# Patient Record
Sex: FEMALE | Race: BLACK OR AFRICAN AMERICAN | Employment: UNEMPLOYED | URBAN - METROPOLITAN AREA
[De-identification: names, ages, dates, MRNs, and addresses within clinical notes are randomized per-mention and may not be internally consistent; named-entity substitution may affect disease eponyms.]

---

## 2017-04-25 ENCOUNTER — OFFICE VISIT (OUTPATIENT)
Dept: INTERNAL MEDICINE CLINIC | Age: 57
End: 2017-04-25

## 2017-04-25 VITALS
WEIGHT: 256 LBS | HEIGHT: 66 IN | TEMPERATURE: 98.5 F | RESPIRATION RATE: 18 BRPM | HEART RATE: 83 BPM | SYSTOLIC BLOOD PRESSURE: 134 MMHG | BODY MASS INDEX: 41.14 KG/M2 | OXYGEN SATURATION: 96 % | DIASTOLIC BLOOD PRESSURE: 88 MMHG

## 2017-04-25 DIAGNOSIS — Z11.59 NEED FOR HEPATITIS C SCREENING TEST: ICD-10-CM

## 2017-04-25 DIAGNOSIS — E78.5 DYSLIPIDEMIA: Chronic | ICD-10-CM

## 2017-04-25 DIAGNOSIS — R05.9 COUGH: ICD-10-CM

## 2017-04-25 DIAGNOSIS — M25.562 PAIN IN BOTH KNEES, UNSPECIFIED CHRONICITY: ICD-10-CM

## 2017-04-25 DIAGNOSIS — Z76.0 MEDICATION REFILL: ICD-10-CM

## 2017-04-25 DIAGNOSIS — M25.561 PAIN IN BOTH KNEES, UNSPECIFIED CHRONICITY: ICD-10-CM

## 2017-04-25 DIAGNOSIS — J06.9 UPPER RESPIRATORY TRACT INFECTION, UNSPECIFIED TYPE: ICD-10-CM

## 2017-04-25 DIAGNOSIS — I10 ESSENTIAL HYPERTENSION: Primary | Chronic | ICD-10-CM

## 2017-04-25 DIAGNOSIS — M25.50 ARTHRALGIA, UNSPECIFIED JOINT: ICD-10-CM

## 2017-04-25 RX ORDER — POTASSIUM CHLORIDE 20 MEQ/1
20 TABLET, EXTENDED RELEASE ORAL DAILY
Qty: 30 TAB | Refills: 5 | Status: SHIPPED | OUTPATIENT
Start: 2017-04-25 | End: 2018-06-05 | Stop reason: SDUPTHER

## 2017-04-25 RX ORDER — METHYLPREDNISOLONE 4 MG/1
TABLET ORAL
Qty: 1 DOSE PACK | Refills: 0 | Status: SHIPPED | OUTPATIENT
Start: 2017-04-25 | End: 2018-09-17

## 2017-04-25 RX ORDER — MELOXICAM 15 MG/1
15 TABLET ORAL DAILY
Qty: 30 TAB | Refills: 5 | Status: SHIPPED | OUTPATIENT
Start: 2017-04-25 | End: 2018-06-05 | Stop reason: SDUPTHER

## 2017-04-25 RX ORDER — CEFPROZIL 500 MG/1
500 TABLET, FILM COATED ORAL 2 TIMES DAILY
Qty: 20 TAB | Refills: 0 | Status: SHIPPED | OUTPATIENT
Start: 2017-04-25 | End: 2018-09-17

## 2017-04-25 RX ORDER — AMITRIPTYLINE HYDROCHLORIDE 25 MG/1
25 TABLET, FILM COATED ORAL
Qty: 30 TAB | Refills: 5 | Status: SHIPPED | OUTPATIENT
Start: 2017-04-25 | End: 2018-06-05 | Stop reason: SDUPTHER

## 2017-04-25 RX ORDER — FUROSEMIDE 40 MG/1
40 TABLET ORAL DAILY
Qty: 30 TAB | Refills: 5 | Status: SHIPPED | OUTPATIENT
Start: 2017-04-25 | End: 2018-06-05 | Stop reason: SDUPTHER

## 2017-04-25 RX ORDER — ATORVASTATIN CALCIUM 10 MG/1
10 TABLET, FILM COATED ORAL DAILY
Qty: 30 TAB | Refills: 5 | Status: CANCELLED | OUTPATIENT
Start: 2017-04-25

## 2017-04-25 RX ORDER — ALBUTEROL SULFATE 90 UG/1
2 AEROSOL, METERED RESPIRATORY (INHALATION)
Qty: 1 INHALER | Refills: 5 | Status: SHIPPED | OUTPATIENT
Start: 2017-04-25 | End: 2018-06-05 | Stop reason: SDUPTHER
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
## 2017-04-27 LAB
ALBUMIN SERPL-MCNC: 3.8 G/DL (ref 3.5–5.5)
ALBUMIN/GLOB SERPL: 1.2 {RATIO} (ref 1.2–2.2)
ALDOLASE SERPL-CCNC: 5.6 U/L (ref 3.3–10.3)
ALP SERPL-CCNC: 98 IU/L (ref 39–117)
ALT SERPL-CCNC: 8 IU/L (ref 0–32)
ANA SER QL: NEGATIVE
AST SERPL-CCNC: 12 IU/L (ref 0–40)
BILIRUB SERPL-MCNC: <0.2 MG/DL (ref 0–1.2)
BUN SERPL-MCNC: 18 MG/DL (ref 6–24)
BUN/CREAT SERPL: 37 (ref 9–23)
CALCIUM SERPL-MCNC: 9.6 MG/DL (ref 8.7–10.2)
CCP IGA+IGG SERPL IA-ACNC: 10 UNITS (ref 0–19)
CHLORIDE SERPL-SCNC: 102 MMOL/L (ref 96–106)
CHOLEST SERPL-MCNC: 215 MG/DL (ref 100–199)
CK SERPL-CCNC: 50 U/L (ref 24–173)
CO2 SERPL-SCNC: 22 MMOL/L (ref 18–29)
CREAT SERPL-MCNC: 0.49 MG/DL (ref 0.57–1)
GLOBULIN SER CALC-MCNC: 3.3 G/DL (ref 1.5–4.5)
GLUCOSE SERPL-MCNC: 90 MG/DL (ref 65–99)
HCV AB S/CO SERPL IA: <0.1 S/CO RATIO (ref 0–0.9)
HCV AB SERPL QL IA: NORMAL
HDLC SERPL-MCNC: 42 MG/DL
INTERPRETATION, 910389: NORMAL
LDLC SERPL CALC-MCNC: 142 MG/DL (ref 0–99)
POTASSIUM SERPL-SCNC: 3.9 MMOL/L (ref 3.5–5.2)
PROT SERPL-MCNC: 7.1 G/DL (ref 6–8.5)
RHEUMATOID FACT SERPL-ACNC: <10 IU/ML (ref 0–13.9)
SODIUM SERPL-SCNC: 141 MMOL/L (ref 134–144)
TRIGL SERPL-MCNC: 155 MG/DL (ref 0–149)
URATE SERPL-MCNC: 5.4 MG/DL (ref 2.5–7.1)
VLDLC SERPL CALC-MCNC: 31 MG/DL (ref 5–40)

## 2017-04-28 RX ORDER — ATORVASTATIN CALCIUM 20 MG/1
20 TABLET, FILM COATED ORAL DAILY
Qty: 30 TAB | Refills: 5 | Status: SHIPPED | OUTPATIENT
Start: 2017-04-28 | End: 2018-06-05 | Stop reason: SDUPTHER

## 2018-06-05 ENCOUNTER — TELEPHONE (OUTPATIENT)
Dept: INTERNAL MEDICINE CLINIC | Age: 58
End: 2018-06-05

## 2018-06-05 ENCOUNTER — OFFICE VISIT (OUTPATIENT)
Dept: INTERNAL MEDICINE CLINIC | Age: 58
End: 2018-06-05

## 2018-06-05 VITALS
HEIGHT: 66 IN | HEART RATE: 84 BPM | SYSTOLIC BLOOD PRESSURE: 147 MMHG | WEIGHT: 241.8 LBS | DIASTOLIC BLOOD PRESSURE: 83 MMHG | BODY MASS INDEX: 38.86 KG/M2 | OXYGEN SATURATION: 100 % | TEMPERATURE: 96.7 F | RESPIRATION RATE: 16 BRPM

## 2018-06-05 DIAGNOSIS — E78.5 DYSLIPIDEMIA: Chronic | ICD-10-CM

## 2018-06-05 DIAGNOSIS — I10 ESSENTIAL HYPERTENSION: Chronic | ICD-10-CM

## 2018-06-05 DIAGNOSIS — R51.9 ACUTE NONINTRACTABLE HEADACHE, UNSPECIFIED HEADACHE TYPE: Primary | ICD-10-CM

## 2018-06-05 DIAGNOSIS — Z76.0 MEDICATION REFILL: ICD-10-CM

## 2018-06-05 PROBLEM — E66.01 SEVERE OBESITY (BMI 35.0-39.9): Status: ACTIVE | Noted: 2018-06-05

## 2018-06-05 RX ORDER — BUTALBITAL, ACETAMINOPHEN AND CAFFEINE 50; 325; 40 MG/1; MG/1; MG/1
1 TABLET ORAL
Qty: 30 TAB | Refills: 1 | Status: SHIPPED | OUTPATIENT
Start: 2018-06-05

## 2018-06-05 RX ORDER — LORATADINE 10 MG/1
10 TABLET ORAL DAILY
Qty: 30 TAB | Refills: 5 | Status: SHIPPED | OUTPATIENT
Start: 2018-06-05 | End: 2018-09-17

## 2018-06-05 RX ORDER — FUROSEMIDE 40 MG/1
40 TABLET ORAL DAILY
Qty: 30 TAB | Refills: 5 | Status: SHIPPED | OUTPATIENT
Start: 2018-06-05 | End: 2018-08-30 | Stop reason: SDUPTHER

## 2018-06-05 RX ORDER — ATORVASTATIN CALCIUM 20 MG/1
20 TABLET, FILM COATED ORAL DAILY
Qty: 30 TAB | Refills: 5 | Status: SHIPPED | OUTPATIENT
Start: 2018-06-05

## 2018-06-05 RX ORDER — GUAIFENESIN 100 MG/5ML
81 LIQUID (ML) ORAL DAILY
COMMUNITY

## 2018-06-05 RX ORDER — KETOROLAC TROMETHAMINE 30 MG/ML
30 INJECTION, SOLUTION INTRAMUSCULAR; INTRAVENOUS ONCE
Qty: 1 VIAL | Refills: 0
Start: 2018-06-05 | End: 2018-06-05

## 2018-06-05 RX ORDER — ALBUTEROL SULFATE 90 UG/1
2 AEROSOL, METERED RESPIRATORY (INHALATION)
Qty: 1 INHALER | Refills: 5 | Status: SHIPPED | OUTPATIENT
Start: 2018-06-05 | End: 2018-08-30 | Stop reason: SDUPTHER

## 2018-06-05 RX ORDER — MELOXICAM 15 MG/1
15 TABLET ORAL DAILY
Qty: 30 TAB | Refills: 5 | Status: SHIPPED | OUTPATIENT
Start: 2018-06-05

## 2018-06-05 RX ORDER — AMITRIPTYLINE HYDROCHLORIDE 25 MG/1
25 TABLET, FILM COATED ORAL
Qty: 30 TAB | Refills: 5 | Status: SHIPPED | OUTPATIENT
Start: 2018-06-05

## 2018-06-05 RX ORDER — POTASSIUM CHLORIDE 20 MEQ/1
20 TABLET, EXTENDED RELEASE ORAL DAILY
Qty: 30 TAB | Refills: 5 | Status: SHIPPED | OUTPATIENT
Start: 2018-06-05

## 2018-06-05 NOTE — MR AVS SNAPSHOT
[facility] 
 
 
 [address] [facility] [address] [id number] 
[phone] Patient: Jordi Hilton 
MRN: [id number]:[id number] Visit Information Date & Time Provider Department Dept. Phone Encounter #  
 6/5/2018  8:45 AM Fela Galan MD [address] [phone] [id number] Follow-up Instructions Return in about 5 weeks (around 7/10/2018) for 30 minutes, multiple issues. Upcoming Health Maintenance Date Due COLONOSCOPY 6/25/1976 Pneumococcal 19-64 Medium Risk (1 of 1 - PPSV23) 6/25/1977 DTaP/Tdap/Td series (1 - Tdap) 6/25/1979 BREAST CANCER SCRN MAMMOGRAM 6/25/2008 ZOSTER VACCINE AGE 60> 4/25/2018 Influenza Age 5 to Adult 8/1/2018 PAP AKA CERVICAL CYTOLOGY 10/23/2018 Allergies as of 6/5/2018  Review Complete On: 6/5/2018 By: Andreas Smith LPN No Known Allergies Current Immunizations  Reviewed on 3/6/2018 Name Date Influenza Vaccine 10/19/2010 Not reviewed this visit You Were Diagnosed With   
  
 Codes Comments Acute nonintractable headache, unspecified headache type    -  Primary ICD-10-CM: R51 ICD-9-CM: 784.0 Essential hypertension     ICD-10-CM: I10 
ICD-9-CM: 401.9 Dyslipidemia     ICD-10-CM: E78.5 ICD-9-CM: 272.4 Medication refill     ICD-10-CM: Z76.0 ICD-9-CM: V68.1 Vitals BP Pulse Temp Resp Height(growth percentile) Weight(growth percentile) 147/83 (BP 1 Location: Right arm, BP Patient Position: Sitting) 84 96.7 °F (35.9 °C) 16 5' 6\" (1.676 m) 241 lb 12.8 oz (109.7 kg) SpO2 BMI OB Status Smoking Status 100% 39.03 kg/m2 Postmenopausal Current Every Day Smoker Vitals History BMI and BSA Data Body Mass Index Body Surface Area 39.03 kg/m 2 2.26 m 2 Preferred Pharmacy Pharmacy Name Phone [facility] [address]  [facility] [phone] Your Updated Medication List  
  
   
This list is accurate as of 6/5/18  9:27 AM.  Always use your most recent med list.  
  
  
  
  
 albuterol 90 mcg/actuation inhaler Commonly known as:  VENTOLIN HFA Take 2 Puffs by inhalation every six (6) hours as needed for Wheezing. amitriptyline 25 mg tablet Commonly known as:  ELAVIL Take 1 Tab by mouth nightly. aspirin 81 mg chewable tablet Take 81 mg by mouth daily. atorvastatin 20 mg tablet Commonly known as:  LIPITOR Take 1 Tab by mouth daily. beclomethasone 80 mcg/actuation [employer] Commonly known as:  QVAR Take 1 Puff by inhalation two (2) times a day. butalbital-acetaminophen-caffeine -40 mg per tablet Commonly known as:  Ari Lang Take 1 Tab by mouth every six (6) hours as needed for Pain. cefPROZIL 500 mg tablet Commonly known as:  CEFZIL Take 1 Tab by mouth two (2) times a day. furosemide 40 mg tablet Commonly known as:  LASIX Take 1 Tab by mouth daily. HYDROcodone-acetaminophen 5-325 mg per tablet Commonly known as:  Billye Dayton Take 1 Tab by mouth every four (4) hours as needed for Pain. Max Daily Amount: 6 Tabs.  
  
 ketorolac 30 mg/mL (1 mL) injection Commonly known as:  TORADOL  
1 mL by IntraVENous route once for 1 dose. loratadine 10 mg tablet Commonly known as:  Phyliss Fly Take 1 Tab by mouth daily. meloxicam 15 mg tablet Commonly known as:  MOBIC Take 1 Tab by mouth daily. methylPREDNISolone 4 mg tablet Commonly known as:  [place] Sample Take as directed on the package. potassium chloride 20 mEq tablet Commonly known as:  K-DUR, KLOR-CON Take 1 Tab by mouth daily. sulfacetamide 10 % ophthalmic solution Commonly known as:  BLEPH-10 Administer 1 Drop to both eyes every four (4) hours. Prescriptions Printed  Refills  
 butalbital-acetaminophen-caffeine (FIORICET, ESGIC) -40 mg per tablet 1  
 Sig: Take 1 Tab by mouth every six (6) hours as needed for Pain. Class: Print Route: Oral  
  
Prescriptions Sent to Pharmacy Refills  
 albuterol (VENTOLIN HFA) 90 mcg/actuation inhaler 5 Sig: Take 2 Puffs by inhalation every six (6) hours as needed for Wheezing. Class: Normal  
 Pharmacy: [address] Ph #: [phone] Route: Inhalation  
 atorvastatin (LIPITOR) 20 mg tablet 5 Sig: Take 1 Tab by mouth daily. Class: Normal  
 Pharmacy: [address] Ph #: [phone] Route: Oral  
 beclomethasone (QVAR) 80 mcg/actuation aero 5 Sig: Take 1 Puff by inhalation two (2) times a day. Class: Normal  
 Pharmacy: [address] Ph #: [phone] Route: Inhalation  
 furosemide (LASIX) 40 mg tablet 5 Sig: Take 1 Tab by mouth daily. Class: Normal  
 Pharmacy: [address] Ph #: [phone] Route: Oral  
 potassium chloride (K-DUR, KLOR-CON) 20 mEq tablet 5 Sig: Take 1 Tab by mouth daily. Class: Normal  
 Pharmacy: [address] Ph #: [phone] Route: Oral  
 meloxicam (MOBIC) 15 mg tablet 5 Sig: Take 1 Tab by mouth daily. Class: Normal  
 Pharmacy: [address] Ph #: [phone] Route: Oral  
 amitriptyline (ELAVIL) 25 mg tablet 5 Sig: Take 1 Tab by mouth nightly. Class: Normal  
 Pharmacy: [address] Ph #: [phone] Route: Oral  
 loratadine (CLARITIN) 10 mg tablet 5 Sig: Take 1 Tab by mouth daily. Class: Normal  
 Pharmacy: [address] Ph #: [phone] Route: Oral  
  
We Performed the Following KETOROLAC TROMETHAMINE INJ [ [facility]] AK THER/PROPH/DIAG INJECTION, SUBCUT/IM [id number] CPT(R)] Follow-up Instructions Return in about 5 weeks (around 7/10/2018) for 30 minutes, multiple issues. Introducing [facility]! [employer] introduces Mov[employer] patient portal. Now you can access parts of your medical record, email your doctor's office, and request medication refills online. 1. In your internet browser, go to [email or web address] 2. Click on the First Time User? Click Here link in the Sign In box. You will see the New Member Sign Up page. 3. Enter your Movable Access Code exactly as it appears below. You will not need to use this code after youve completed the sign-up process. If you do not sign up before the expiration date, you must request a new code. · Movable Access Code: [email or web address] Expires: 9/3/2018  9:27 AM 
 
4. Enter the last four digits of your Social Security Number (xxxx) and Date of Birth (mm/dd/yyyy) as indicated and click Submit. You will be taken to the next sign-up page. 5. Create a Movable ID. This will be your Movable login ID and cannot be changed, so think of one that is secure and easy to remember. 6. Create a Movable password. You can change your password at any time. 7. Enter your Password Reset Question and Answer. This can be used at a later time if you forget your password. 8. Enter your e-mail address. You will receive e-mail notification when new information is available in [address] 9. Click Sign Up. You can now view and download portions of your medical record. 10. Click the Download Summary menu link to download a portable copy of your medical information. If you have questions, please visit the Frequently Asked Questions section of the Movable website. Remember, Movable is NOT to be used for urgent needs. For medical emergencies, dial 911. Now available from your iPhone and Android! Please provide this summary of care documentation to your next provider. Your primary care clinician is listed as [facility]. If you have any questions after today's visit, please call [phone].
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
## 2018-06-05 NOTE — TELEPHONE ENCOUNTER
PA request for : Qvar [employer]/ACT IN [place]    PA submitted through [facility] PA Form. Will await response via fax.

## 2018-06-05 NOTE — PROGRESS NOTES
HISTORY OF PRESENT ILLNESS  Shanel Collazo is a 59 y.o. female. Visit Vitals    /83 (BP 1 Location: Right arm, BP Patient Position: Sitting)    Pulse 84    Temp 96.7 °F (35.9 °C)    Resp 16    Ht 5' 6\" (1.676 m)    Wt 241 lb 12.8 oz (109.7 kg)    SpO2 100%    BMI 39.03 kg/m2       Headache   The history is provided by the patient. This is a chronic problem. The current episode started more than 1 week ago. The problem occurs daily. The problem has not changed since onset. Associated symptoms include headaches. Pertinent negatives include no chest pain and no shortness of breath. Exacerbated by: nothing. Relieved by: nothing. Treatments tried: aspitine q 6 h. The treatment provided mild relief. Review of Systems   Constitutional: Negative. HENT: Positive for congestion. Negative for ear pain and sore throat. Eyes: Negative for blurred vision and double vision. Respiratory: Negative for shortness of breath. Cardiovascular: Negative for chest pain and palpitations. Neurological: Positive for headaches. Physical Exam   Constitutional: She is oriented to person, place, and time. She appears well-developed and well-nourished. No distress. HENT:   Right Ear: External ear and ear canal normal. Tympanic membrane is bulging. Left Ear: External ear and ear canal normal. Tympanic membrane is bulging. Nose: Right sinus exhibits maxillary sinus tenderness. Right sinus exhibits no frontal sinus tenderness. Left sinus exhibits maxillary sinus tenderness. Mouth/Throat: Uvula is midline, oropharynx is clear and moist and mucous membranes are normal.   TMJs negative   Eyes: Conjunctivae and EOM are normal. Pupils are equal, round, and reactive to light. No photophobia. Mild pain palpating both temples   Neck: Neck supple. Cardiovascular: Normal rate. Pulmonary/Chest: Effort normal.   Lymphadenopathy:     She has no cervical adenopathy.    Neurological: She is alert and oriented to person, place, and time. Skin: Skin is warm and dry. She is not diaphoretic. Nursing note and vitals reviewed. ASSESSMENT and PLAN    ICD-10-CM ICD-9-CM    1. Acute nonintractable headache, unspecified headache type R51 784.0 ketorolac (TORADOL) 30 mg/mL (1 mL) injection      KETOROLAC TROMETHAMINE INJ      IN THER/PROPH/DIAG INJECTION, SUBCUT/IM      butalbital-acetaminophen-caffeine (FIORICET, ESGIC) -40 mg per tablet      loratadine (CLARITIN) 10 mg tablet   2. Essential hypertension I10 401.9    3. Dyslipidemia E78.5 272.4    4. Medication refill Z76.0 V68.1 albuterol (VENTOLIN HFA) 90 mcg/actuation inhaler      atorvastatin (LIPITOR) 20 mg tablet      beclomethasone (QVAR) 80 mcg/actuation aero      furosemide (LASIX) 40 mg tablet      potassium chloride (K-DUR, KLOR-CON) 20 mEq tablet      meloxicam (MOBIC) 15 mg tablet      amitriptyline (ELAVIL) 25 mg tablet       Headache without worrisome features. May be sinus, may be migraine. May be BP related    She has been out of her meds due to insurance issues--refill meds today  She has not been here in a while due to insurance issues    F/u 4-5 weeks for 30 minutes to  the pieces.

## 2018-07-09 ENCOUNTER — OFFICE VISIT (OUTPATIENT)
Dept: INTERNAL MEDICINE CLINIC | Age: 58
End: 2018-07-09

## 2018-07-09 VITALS
RESPIRATION RATE: 18 BRPM | HEIGHT: 66 IN | WEIGHT: 247.4 LBS | OXYGEN SATURATION: 100 % | SYSTOLIC BLOOD PRESSURE: 137 MMHG | HEART RATE: 88 BPM | TEMPERATURE: 98.1 F | DIASTOLIC BLOOD PRESSURE: 88 MMHG | BODY MASS INDEX: 39.76 KG/M2

## 2018-07-09 DIAGNOSIS — Z12.11 SCREEN FOR COLON CANCER: ICD-10-CM

## 2018-07-09 DIAGNOSIS — Z12.31 SCREENING MAMMOGRAM, ENCOUNTER FOR: ICD-10-CM

## 2018-07-09 DIAGNOSIS — E66.01 SEVERE OBESITY (BMI 35.0-39.9): ICD-10-CM

## 2018-07-09 DIAGNOSIS — L72.3 SEBACEOUS CYST: Primary | ICD-10-CM

## 2018-07-09 NOTE — PROGRESS NOTES
HISTORY OF PRESENT ILLNESS Ofelia King is a 59 y.o. female. Visit Vitals  /88 (BP 1 Location: Left arm, BP Patient Position: Sitting)  Pulse 88  Temp 98.1 °F (36.7 °C) (Oral)  Resp 18  Ht 5' 6\" (1.676 m)  Wt 247 lb 6.4 oz (112.2 kg)  SpO2 100%  BMI 39.93 kg/m2 HPI Comments: Noticed about a week ago a swelling on her right lateral mid neck. It is not painful. It is not draining. No sore throat and ear ache Mass The history is provided by the patient (see comments). This is a new problem. The current episode started more than 1 week ago. The problem occurs daily. The problem has not changed since onset. Review of Systems Constitutional: Negative for chills and fever. HENT:  
     Neck mass Physical Exam  
Constitutional: She is oriented to person, place, and time. She appears well-developed and well-nourished. No distress. Neck:  
 
 
Neurological: She is alert and oriented to person, place, and time. Skin: Skin is warm and dry. She is not diaphoretic. Nursing note and vitals reviewed. ASSESSMENT and PLAN 
  ICD-10-CM ICD-9-CM 1. Sebaceous cyst L72.3 706.2 REFERRAL TO GENERAL SURGERY 2. Screening mammogram, encounter for Z12.31 V76.12 IDALIA MAMMO BI SCREENING INCL CAD 3. Screen for colon cancer Z12.11 V76.51 COLOGUARD TEST (FECAL DNA COLORECTAL CANCER SCREENING) Not currently inflamed or infected but as it is growing, needs excision before it gets inflamed. F/u here prn or as appointed Incidentally, Discussed BMI/weight, lifestyle, diet and exercise. Discussed effect on blood pressure, blood sugar, and joints especially Focus on limiting white carbs, portion control, and moving more.  
 
F/u one month  For WWE

## 2018-07-09 NOTE — ASSESSMENT & PLAN NOTE
Uncontrolled, based on history, physical exam and review of pertinent labs, studies and medications; meds reconciled; continue current treatment plan, lifestyle modifications recommended. Key Obesity Meds             furosemide (LASIX) 40 mg tablet  (Taking) Take 1 Tab by mouth daily.         Lab Results   Component Value Date/Time    Glucose 90 04/25/2017 03:30 PM    Cholesterol, total 215 04/25/2017 03:30 PM    HDL Cholesterol 42 04/25/2017 03:30 PM    LDL, calculated 142 04/25/2017 03:30 PM    Triglyceride 155 04/25/2017 03:30 PM    Sodium 141 04/25/2017 03:30 PM    Potassium 3.9 04/25/2017 03:30 PM    ALT (SGPT) 8 04/25/2017 03:30 PM    AST (SGOT) 12 04/25/2017 03:30 PM

## 2018-07-09 NOTE — PROGRESS NOTES
ROOM # 5    Mary Frazier presents today for   Chief Complaint   Patient presents with    Neck Swelling     knot per pt x 1 week       Mary Frazier preferred language for health care discussion is english/other. Is someone accompanying this pt? no    Is the patient using any DME equipment during OV? no    Depression Screening:  PHQ over the last two weeks 7/9/2018 6/5/2018 4/25/2017 5/19/2016 1/7/2015 1/7/2015   Little interest or pleasure in doing things Not at all Several days Not at all Not at all Several days Not at all   Feeling down, depressed or hopeless Not at all Several days Not at all Not at all More than half the days Not at all   Total Score PHQ 2 0 2 0 0 3 0       Learning Assessment:  Learning Assessment 6/5/2018   PRIMARY LEARNER Patient   HIGHEST LEVEL OF EDUCATION - PRIMARY LEARNER  GRADUATED HIGH SCHOOL OR GED   BARRIERS PRIMARY LEARNER NONE   CO-LEARNER CAREGIVER No   PRIMARY LANGUAGE ENGLISH   LEARNER PREFERENCE PRIMARY LISTENING   ANSWERED BY Patient   RELATIONSHIP SELF         Health Maintenance reviewed and discussed per provider. Yes    Mary Frazier is due for   Health Maintenance Due   Topic Date Due    COLONOSCOPY  06/25/1976    Pneumococcal 19-64 Medium Risk (1 of 1 - PPSV23) 06/25/1977    DTaP/Tdap/Td series (1 - Tdap) 06/25/1979    BREAST CANCER SCRN MAMMOGRAM  06/25/2008    ZOSTER VACCINE AGE 60>  04/25/2018    PAP AKA CERVICAL CYTOLOGY  10/23/2018     Please order/place referral if appropriate. Advance Directive:  1. Do you have an advance directive in place? Patient Reply: no    2. If not, would you like material regarding how to put one in place? Patient Reply: no    Coordination of Care:  1. Have you been to the ER, urgent care clinic since your last visit? Hospitalized since your last visit? no    2. Have you seen or consulted any other health care providers outside of the [facility] since your last visit?  Include any pap smears or colon screening.  no

## 2018-07-09 NOTE — MR AVS SNAPSHOT
[address] [place] [address] 
 
 
 [address] [facility] [address] 
[phone] Patient: Jordy Griffinr 
MRN: [id number] KG:4/78/[id number] Visit Information Date & Time Provider Department Dept. Phone Encounter #  
 7/9/2018  8:45 AM Jace Pritchard MD [facility] [facility] [phone] [id number] Follow-up Instructions Return in about 1 month (around 8/9/2018) for Well Woman Exam. Upcoming Health Maintenance Date Due COLONOSCOPY 6/25/1976 Pneumococcal 19-64 Medium Risk (1 of 1 - PPSV23) 6/25/1977 DTaP/Tdap/Td series (1 - Tdap) 6/25/1979 BREAST CANCER SCRN MAMMOGRAM 6/25/2008 ZOSTER VACCINE AGE 60> 4/25/2018 PAP AKA CERVICAL CYTOLOGY 10/23/2018 Influenza Age 5 to Adult 8/1/2018 Allergies as of 7/9/2018  Review Complete On: 7/9/2018 By: Jace Pritchard MD  
 No Known Allergies Current Immunizations  Reviewed on 3/6/2018 Name Date Influenza Vaccine 10/19/2010 Not reviewed this visit You Were Diagnosed With   
  
 Codes Comments Sebaceous cyst    -  Primary ICD-10-CM: L72.3 ICD-9-CM: 706.2 Screening mammogram, encounter for     ICD-10-CM: Z12.31 
ICD-9-CM: V76.12 Screen for colon cancer     ICD-10-CM: Z12.11 ICD-9-CM: V76.51 Severe obesity (BMI 35.0-39.9) (HCC)     ICD-10-CM: E66.01 
ICD-9-CM: 278.01 Vitals BP Pulse Temp Resp Height(growth percentile) Weight(growth percentile) 137/88 (BP 1 Location: Left arm, BP Patient Position: Sitting) 88 98.1 °F (36.7 °C) (Oral) 18 5' 6\" (1.676 m) 247 lb 6.4 oz (112.2 kg) SpO2 BMI OB Status Smoking Status 100% 39.93 kg/m2 Postmenopausal Current Every Day Smoker Vitals History BMI and BSA Data Body Mass Index Body Surface Area  
 39.93 kg/m 2 2.29 m 2 Preferred Pharmacy Pharmacy Name Phone [address] [email or web address] [address], [address] [place] [address] [phone] Your Updated Medication List  
  
   
This list is accurate as of 7/9/18  9:44 AM.  Always use your most recent med list.  
  
  
  
  
 albuterol 90 mcg/actuation inhaler Commonly known as:  VENTOLIN HFA Take 2 Puffs by inhalation every six (6) hours as needed for Wheezing. amitriptyline 25 mg tablet Commonly known as:  ELAVIL Take 1 Tab by mouth nightly. aspirin 81 mg chewable tablet Take 81 mg by mouth daily. atorvastatin 20 mg tablet Commonly known as:  LIPITOR Take 1 Tab by mouth daily. beclomethasone 80 mcg/actuation [employer] Commonly known as:  QVAR Take 1 Puff by inhalation two (2) times a day. butalbital-acetaminophen-caffeine -40 mg per tablet Commonly known as:  Lukasz [place] Take 1 Tab by mouth every six (6) hours as needed for Pain. cefPROZIL 500 mg tablet Commonly known as:  CEFZIL Take 1 Tab by mouth two (2) times a day. furosemide 40 mg tablet Commonly known as:  LASIX Take 1 Tab by mouth daily. HYDROcodone-acetaminophen 5-325 mg per tablet Commonly known as:  Carson Errol Take 1 Tab by mouth every four (4) hours as needed for Pain. Max Daily Amount: 6 Tabs.  
  
 loratadine 10 mg tablet Commonly known as:  Pattie [place] Take 1 Tab by mouth daily. meloxicam 15 mg tablet Commonly known as:  MOBIC Take 1 Tab by mouth daily. methylPREDNISolone 4 mg tablet Commonly known as:  Ely Garza Take as directed on the package. potassium chloride 20 mEq tablet Commonly known as:  K-DUR, KLOR-CON Take 1 Tab by mouth daily. sulfacetamide 10 % ophthalmic solution Commonly known as:  BLEPH-10 Administer 1 Drop to both eyes every four (4) hours. We Performed the Following REFERRAL TO GENERAL SURGERY [REF27 Custom] Follow-up Instructions Return in about 1 month (around 8/9/2018) for Well Woman Exam. To-Do List   
 07/09/2018 Lab:  COLOGUARD TEST (FECAL DNA COLORECTAL CANCER SCREENING) Around 07/09/2018 Imaging:  [facility] MAMMO BI SCREENING INCL CAD Referral Information Referral ID Referred By Referred To  
  
 [id number] [facility] Not Available Visits Status Start Date End Date 1 New Request 7/9/18 7/9/19 If your referral has a status of pending review or denied, additional information will be sent to support the outcome of this decision. Introducing [facility] & HEALTH SERVICES! [employer] introduces [employer]pp[employer] patient portal. Now you can access parts of your medical record, email your doctor's office, and request medication refills online. 1. In your internet browser, go to [email or web address] 2. Click on the First Time User? Click Here link in the Sign In box. You will see the New Member Sign Up page. 3. Enter your Troppin Access Code exactly as it appears below. You will not need to use this code after youve completed the sign-up process. If you do not sign up before the expiration date, you must request a new code. · Troppin Access Code: [email or web address] Expires: 9/3/2018  9:27 AM 
 
4. Enter the last four digits of your Social Security Number (xxxx) and Date of Birth (mm/dd/yyyy) as indicated and click Submit. You will be taken to the next sign-up page. 5. Create a Troppin ID. This will be your Troppin login ID and cannot be changed, so think of one that is secure and easy to remember. 6. Create a Troppin password. You can change your password at any time. 7. Enter your Password Reset Question and Answer. This can be used at a later time if you forget your password. 8. Enter your e-mail address. You will receive e-mail notification when new information is available in [address] 9. Click Sign Up. You can now view and download portions of your medical record. 10. Click the Download Summary menu link to download a portable copy of your medical information. If you have questions, please visit the Frequently Asked Questions section of the Kraken website. Remember, Kraken is NOT to be used for urgent needs. For medical emergencies, dial 911. Now available from your iPhone and Android! Please provide this summary of care documentation to your next provider. Your primary care clinician is listed as [facility]. If you have any questions after today's visit, please call [phone].

## 2018-07-12 ENCOUNTER — TELEPHONE (OUTPATIENT)
Dept: SURGERY | Age: 58
End: 2018-07-12

## 2018-07-12 NOTE — TELEPHONE ENCOUNTER
7/12/18 tried calling patient to schedule for cyst with Dr. Sushma Lujan. No answer and voicemail full.

## 2018-08-29 ENCOUNTER — OFFICE VISIT (OUTPATIENT)
Dept: SURGERY | Age: 58
End: 2018-08-29

## 2018-08-29 VITALS
DIASTOLIC BLOOD PRESSURE: 88 MMHG | BODY MASS INDEX: 39.21 KG/M2 | SYSTOLIC BLOOD PRESSURE: 158 MMHG | HEART RATE: 80 BPM | HEIGHT: 66 IN | TEMPERATURE: 97.3 F | WEIGHT: 244 LBS | OXYGEN SATURATION: 100 %

## 2018-08-29 DIAGNOSIS — R22.1 MASS OF RIGHT SIDE OF NECK: Primary | ICD-10-CM

## 2018-08-29 NOTE — PROGRESS NOTES
General Surgery Consult    Massiel Smallwood  Admit date: (Not on file)    MRN: [id number]     : 1960     Age: 59 y.o. Attending Physician: Daron Lundy MD, [facility]      History of Present Illness:      Massiel Smallwood is a 59 y.o. female who is presenting with a right neck mass. The patient is stating that she had this mass for about 3-4 months. She does not have any pain but sometimes discomfort when she touched a masses she said they are to mass. One is larger than the other and they are located next to each other. She denies any fever or chills. She denies any night sweats. She denies any weakness or weight loss. The patient stated that she had bilateral upper thigh masses in the past that she described as exactly the same as the one in her neck and she said they were lipomas. The patient was referred to me for the possibility of a sebaceous cyst.  She did not have any imaging studies.     Patient Active Problem List    Diagnosis Date Noted    Severe obesity (BMI 35.0-39.9) ([facility] 2018    Essential hypertension 10/20/2015    Asthma 10/20/2015    Dyslipidemia 10/20/2015     Past Medical History:   Diagnosis Date    Abnormal CXR     ballistic fragments in left shoulder    Asthma     Hypertension     Psychotic disorder     Smoker       Past Surgical History:   Procedure Laterality Date    HX OTHER SURGICAL      skin cysts removed      Social History   Substance Use Topics    Smoking status: Current Every Day Smoker     Packs/day: 1.00     Years: 40.00     Types: Cigarettes    Smokeless tobacco: Never Used      Comment: pt unable/ unwilling to quit at this time    Alcohol use No      History   Smoking Status    Current Every Day Smoker    Packs/day: 1.00    Years: 40.00    Types: Cigarettes   Smokeless Tobacco    Never Used     Comment: pt unable/ unwilling to quit at this time     Family History   Problem Relation Age of Onset    Stroke Mother     Stroke Father    Velta Ganser Stroke Maternal Grandmother     Stroke Maternal Grandfather       Current Outpatient Prescriptions   Medication Sig    aspirin 81 mg chewable tablet Take 81 mg by mouth daily.  albuterol (VENTOLIN HFA) 90 mcg/actuation inhaler Take 2 Puffs by inhalation every six (6) hours as needed for Wheezing.  furosemide (LASIX) 40 mg tablet Take 1 Tab by mouth daily.  potassium chloride (K-DUR, KLOR-CON) 20 mEq tablet Take 1 Tab by mouth daily.  meloxicam (MOBIC) 15 mg tablet Take 1 Tab by mouth daily.  amitriptyline (ELAVIL) 25 mg tablet Take 1 Tab by mouth nightly.  butalbital-acetaminophen-caffeine (FIORICET, ESGIC) -40 mg per tablet Take 1 Tab by mouth every six (6) hours as needed for Pain.  loratadine (CLARITIN) 10 mg tablet Take 1 Tab by mouth daily.  atorvastatin (LIPITOR) 20 mg tablet Take 1 Tab by mouth daily.  beclomethasone (QVAR) 80 mcg/actuation aero Take 1 Puff by inhalation two (2) times a day.  methylPREDNISolone (MEDROL DOSEPACK) 4 mg tablet Take as directed on the package.  cefPROZIL (CEFZIL) 500 mg tablet Take 1 Tab by mouth two (2) times a day.  HYDROcodone-acetaminophen (NORCO) 5-325 mg per tablet Take 1 Tab by mouth every four (4) hours as needed for Pain. Max Daily Amount: 6 Tabs.  sulfacetamide (BLEPH-10) 10 % ophthalmic solution Administer 1 Drop to both eyes every four (4) hours. No current facility-administered medications for this visit. No Known Allergies       Review of Systems:  Pertinent items are noted in the History of Present Illness.     Objective:     Visit Vitals    /88 (BP 1 Location: Left arm, BP Patient Position: Sitting)    Pulse 80    Temp 97.3 °F (36.3 °C) (Oral)    Ht 5' 6\" (1.676 m)    Wt 110.7 kg (244 lb)    SpO2 100%    BMI 39.38 kg/m2       Physical Exam:      General:  in no apparent distress and alert   Eyes:  conjunctivae and sclerae normal, pupils equal, round, reactive to light   Throat & Neck:  There is stool right posterior lower neck masses one is about 1.5 cm and another is about 2 cm in size. Both are easily movable and nontender. However both are slightly hard to palpation compared for a lipoma. Lungs:   clear to auscultation bilaterally   Heart:  Regular rate and rhythm                   Imaging and Lab Review:     CBC:   Lab Results   Component Value Date/Time    WBC 8.2 01/23/2015 10:24 AM    RBC 4.42 01/23/2015 10:24 AM    HGB 13.4 01/23/2015 10:24 AM    HCT 41.2 01/23/2015 10:24 AM    PLATELET 972 03/59/6795 10:24 AM     BMP:   Lab Results   Component Value Date/Time    Glucose 90 04/25/2017 03:30 PM    Sodium 141 04/25/2017 03:30 PM    Potassium 3.9 04/25/2017 03:30 PM    Chloride 102 04/25/2017 03:30 PM    CO2 22 04/25/2017 03:30 PM    BUN 18 04/25/2017 03:30 PM    Creatinine 0.49 (L) 04/25/2017 03:30 PM    Calcium 9.6 04/25/2017 03:30 PM     CMP:  Lab Results   Component Value Date/Time    Glucose 90 04/25/2017 03:30 PM    Sodium 141 04/25/2017 03:30 PM    Potassium 3.9 04/25/2017 03:30 PM    Chloride 102 04/25/2017 03:30 PM    CO2 22 04/25/2017 03:30 PM    BUN 18 04/25/2017 03:30 PM    Creatinine 0.49 (L) 04/25/2017 03:30 PM    Calcium 9.6 04/25/2017 03:30 PM    BUN/Creatinine ratio 37 (H) 04/25/2017 03:30 PM    Alk. phosphatase 98 04/25/2017 03:30 PM    Protein, total 7.1 04/25/2017 03:30 PM    Albumin 3.8 04/25/2017 03:30 PM    A-G Ratio 1.2 04/25/2017 03:30 PM       No results found for this or any previous visit (from the past 24 hour(s)). images and reports reviewed    Assessment:   Yasmine Alvarado is a 59 y.o. female is presenting with right posterior neck mass ×2. I explained to the patient though she had a history of lipoma in the past this mass better to be excised. Though they are easily movable and nontender there are slightly hard to palpation. It is unlikely to be cancerous but expected to be excised.   It still could be a sebaceous cyst.  The patient said she would like to proceed with the surgery for excision of neck mass ×2. Plan: We will proceed with excision of right neck mass ×2.     Please call me if you have any questions (cell phone: [phone])     Signed By: Lynne Martinez MD     August 29, 2018

## 2018-08-29 NOTE — PROGRESS NOTES
Patient is here with concerns regarding a sebaceous cyst located on the right side of her neck, noticed about 4 months ago denies any pain. Patient alden that its getting bigger in size which concerns her. 1. Have you been to the ER, urgent care clinic since your last visit? Hospitalized since your last visit? No    2. Have you seen or consulted any other health care providers outside of the [address] [place] [address] since your last visit? Include any pap smears or colon screening.  No

## 2018-08-29 NOTE — MR AVS SNAPSHOT
[address] [facility] [place], [facility] 
 
 
 [address] [place] [address] [address] 
[phone] Patient: Keiry Suarez 
MRN: [id number] Visit Information Date & Time Provider Department Dept. Phone Encounter #  
 8/29/2018  1:15 PM Douglas Choudhury MD [facility] [phone] [id number] Your Appointments 9/17/2018 10:30 AM  
POST OP with Douglas Choudhury MD  
[address] [place] [address]) Appt Note: Post Op [address] [place] [address]  
  
   
 [address] [facility] [address] [address] Upcoming Health Maintenance Date Due COLONOSCOPY 6/25/1976 Pneumococcal 19-64 Medium Risk (1 of 1 - PPSV23) 6/25/1977 DTaP/Tdap/Td series (1 - Tdap) 6/25/1979 BREAST CANCER SCRN MAMMOGRAM 6/25/2008 ZOSTER VACCINE AGE 60> 4/25/2018 Influenza Age 5 to Adult 8/1/2018 PAP AKA CERVICAL CYTOLOGY 10/23/2018 Allergies as of 8/29/2018  Review Complete On: 8/29/2018 By: Meron Rinaldi LPN No Known Allergies Current Immunizations  Reviewed on 3/6/2018 Name Date Influenza Vaccine 10/19/2010 Not reviewed this visit You Were Diagnosed With   
  
 Codes Comments Mass of right side of neck    -  Primary ICD-10-CM: R22.1 ICD-9-CM: 938. 2 Vitals BP Pulse Temp Height(growth percentile) Weight(growth percentile) SpO2  
 158/88 (BP 1 Location: Left arm, BP Patient Position: Sitting) 80 97.3 °F (36.3 °C) (Oral) 5' 6\" (1.676 m) 244 lb (110.7 kg) 100% BMI OB Status Smoking Status 39.38 kg/m2 Postmenopausal Current Every Day Smoker BMI and BSA Data Body Mass Index Body Surface Area  
 39.38 kg/m 2 2.27 m 2 Preferred Pharmacy Pharmacy Name Phone [address], [facility] [address] [phone] Your Updated Medication List  
  
 This list is accurate as of 8/29/18  1:49 PM.  Always use your most recent med list.  
  
  
  
  
 albuterol 90 mcg/actuation inhaler Commonly known as:  VENTOLIN HFA Take 2 Puffs by inhalation every six (6) hours as needed for Wheezing. amitriptyline 25 mg tablet Commonly known as:  ELAVIL Take 1 Tab by mouth nightly. aspirin 81 mg chewable tablet Take 81 mg by mouth daily. atorvastatin 20 mg tablet Commonly known as:  LIPITOR Take 1 Tab by mouth daily. beclomethasone 80 mcg/actuation [employer] Commonly known as:  QVAR Take 1 Puff by inhalation two (2) times a day. butalbital-acetaminophen-caffeine -40 mg per tablet Commonly known as:  Edita Laurenky Take 1 Tab by mouth every six (6) hours as needed for Pain. cefPROZIL 500 mg tablet Commonly known as:  CEFZIL Take 1 Tab by mouth two (2) times a day. furosemide 40 mg tablet Commonly known as:  LASIX Take 1 Tab by mouth daily. HYDROcodone-acetaminophen 5-325 mg per tablet Commonly known as:  Kwaku Herrlich Take 1 Tab by mouth every four (4) hours as needed for Pain. Max Daily Amount: 6 Tabs.  
  
 loratadine 10 mg tablet Commonly known as:  Alpheus French Take 1 Tab by mouth daily. meloxicam 15 mg tablet Commonly known as:  MOBIC Take 1 Tab by mouth daily. methylPREDNISolone 4 mg tablet Commonly known as:  Karle [place] Take as directed on the package. potassium chloride 20 mEq tablet Commonly known as:  K-DUR, KLOR-CON Take 1 Tab by mouth daily. sulfacetamide 10 % ophthalmic solution Commonly known as:  BLEPH-10 Administer 1 Drop to both eyes every four (4) hours. Patient Instructions If you have any questions or concerns about today's appointment, the verbal and/or written instructions you were given for follow up care, please call our office at [phone]. [facility] [place] [address] [facility], [address] 
 
[phone] office [phone] fax [place]sei PATIENT PRE AND POST OPERATIVE INSTRUCTIONS [address] [place] [address] [facility] 
[phone] Before Surgery Instructions:  
1) You must have someone available to drive you to and from your procedure and stay with you for the first 24 hours. 2) It is very important that you have nothing to eat or drink after midnight the night before your surgery. This includes chewing gum or sucking on hard candy. Take only heart, blood pressure and cholesterol medications the morning of surgery with only a sip of water. 3) Please stop taking Plavix 10 days prior to your surgery. Stop taking Coumadin 5 days prior to your surgery. Stop taking all Aspirin or Aspirin containing products 7 days prior to your surgery. Stop taking Advil, Motrin, Aleve, and etc. 3 days prior to your surgery. 4) If you take any diabetic medications please consult with your primary care physician on how to take them on the day of your surgery 5) Please stop all Herbal products 2 weeks prior to your surgery. 6) Please arrive at the hospital 2 hours prior to your surgery, unless you have been otherwise instructed. 7) Patients having an operation on their colon will be given a separate instruction sheet on their Bowel Prep. 8) For any pre-operative work up check in at the main entrance to [address] [place] [address], and then go to Patient Registration. These studies are done on a walk in basis they are open from 7:00am to 5:00pm Monday through Friday. 9) Please wash your surgical site the morning of your surgery with soap and water. 10) If you are of child bearing age you will have pregnancy test done the morning of your surgery as soon as you arrive. 11) You may be contacted to change your surgery time. At times this is necessary due to equipment or staffing needs. After Surgery Instructions: You will need to be seen in the office for a follow-up visit 7-14 days after your surgery. Please call after you have had the procedure to make this appointment. Unless otherwise instructed, you may remove your outer bandage and shower 48 hours after your surgery. If you develop a fever greater than 101, have any significant drainage, bleeding, swelling and/or pus of the wound. Please call our office immediately. Surgery Date and Time:  Tuesday, September 4, 2018 at 12:00pm 
 
Please check in at [facility], enter through the Emergency Room entrance and go up to the second floor. Please check in by 10:00am the day of your surgery. You may contact [facility] with any questions at [phone]. Introducing hospitals & HEALTH SERVICES! [employer] introduces [employer] patient portal. Now you can access parts of your medical record, email your doctor's office, and request medication refills online. 1. In your internet browser, go to [email or web address] 2. Click on the First Time User? Click Here link in the Sign In box. You will see the New Member Sign Up page. 3. Enter your [employer] Access Code exactly as it appears below. You will not need to use this code after youve completed the sign-up process. If you do not sign up before the expiration date, you must request a new code. · [employer] Access Code: [email or web address] Expires: 9/3/2018  9:27 AM 
 
4. Enter the last four digits of your Social Security Number (xxxx) and Date of Birth (mm/dd/yyyy) as indicated and click Submit. You will be taken to the next sign-up page. 5. Create a Aml[employer]ict ID. This will be your [employer] login ID and cannot be changed, so think of one that is secure and easy to remember. 6. Create a [employer] password. You can change your password at any time. 7. Enter your Password Reset Question and Answer. This can be used at a later time if you forget your password. 8. Enter your e-mail address. You will receive e-mail notification when new information is available in [address] 9. Click Sign Up. You can now view and download portions of your medical record. 10. Click the Download Summary menu link to download a portable copy of your medical information. If you have questions, please visit the Frequently Asked Questions section of the MeUndies website. Remember, MeUndies is NOT to be used for urgent needs. For medical emergencies, dial 911. Now available from your iPhone and Android! Please provide this summary of care documentation to your next provider. Your primary care clinician is listed as [facility]. If you have any questions after today's visit, please call [phone].

## 2018-08-29 NOTE — PATIENT INSTRUCTIONS
If you have any questions or concerns about today's appointment, the verbal and/or written instructions you were given for follow up care, please call our office at [phone]. [facility] - [place] [address]    [phone] office  [phone] fax      . Ector Leslie PATIENT PRE AND POST OPERATIVE INSTRUCTIONS     [facility] [address]  [phone]    Before Surgery Instructions:   1) You must have someone available to drive you to and from your procedure and stay with you for the first 24 hours. 2) It is very important that you have nothing to eat or drink after midnight the night before your surgery. This includes chewing gum or sucking on hard candy. Take only heart, blood pressure and cholesterol medications the morning of surgery with only a sip of water. 3) Please stop taking Plavix 10 days prior to your surgery. Stop taking Coumadin 5 days prior to your surgery. Stop taking all Aspirin or Aspirin containing products 7 days prior to your surgery. Stop taking Advil, Motrin, Aleve, and etc. 3 days prior to your surgery. 4) If you take any diabetic medications please consult with your primary care physician on how to take them on the day of your surgery  5) Please stop all Herbal products 2 weeks prior to your surgery. 6) Please arrive at the hospital 2 hours prior to your surgery, unless you have been otherwise instructed. 7) Patients having an operation on their colon will be given a separate instruction sheet on their Bowel Prep. 8) For any pre-operative work up check in at the main entrance to [facility], and then go to Patient Registration. These studies are done on a walk in basis they are open from 7:00am to 5:00pm Monday through Friday. 9) Please wash your surgical site the morning of your surgery with soap and water.   10) If you are of child bearing age you will have pregnancy test done the morning of your surgery as soon as you arrive. 11) You may be contacted to change your surgery time. At times this is necessary due to equipment or staffing needs. After Surgery Instructions: You will need to be seen in the office for a follow-up visit 7-14 days after your surgery. Please call after you have had the procedure to make this appointment. Unless otherwise instructed, you may remove your outer bandage and shower 48 hours after your surgery. If you develop a fever greater than 101, have any significant drainage, bleeding, swelling and/or pus of the wound. Please call our office immediately. Surgery Date and Time:  Tuesday, September 4, 2018 at 12:00pm    Please check in at [facility], enter through the Emergency Room entrance and go up to the second floor. Please check in by 10:00am the day of your surgery. You may contact Yomaira Piña with any questions at [phone].

## 2018-08-30 DIAGNOSIS — Z76.0 MEDICATION REFILL: ICD-10-CM

## 2018-08-30 DIAGNOSIS — J06.9 UPPER RESPIRATORY TRACT INFECTION, UNSPECIFIED TYPE: ICD-10-CM

## 2018-08-30 RX ORDER — ALBUTEROL SULFATE 90 UG/1
2 AEROSOL, METERED RESPIRATORY (INHALATION)
Qty: 1 INHALER | Refills: 5 | Status: SHIPPED | OUTPATIENT
Start: 2018-08-30

## 2018-08-30 RX ORDER — FUROSEMIDE 40 MG/1
40 TABLET ORAL DAILY
Qty: 30 TAB | Refills: 5 | Status: SHIPPED | OUTPATIENT
Start: 2018-08-30

## 2018-08-30 RX ORDER — BENZONATATE 200 MG/1
200 CAPSULE ORAL
Qty: 21 CAP | Refills: 0 | Status: SHIPPED | OUTPATIENT
Start: 2018-08-30 | End: 2018-09-06

## 2018-08-30 NOTE — TELEPHONE ENCOUNTER
Patient's daughter is requesting the refills, states he mother also need to go back on the BP medication. States her BP has been ori the past few days. Last OV 7/9/18, no future appt scheduled. Requested Prescriptions     Pending Prescriptions Disp Refills    benzonatate (TESSALON) 200 mg capsule 21 Cap 0     Sig: Take 1 Cap by mouth three (3) times daily as needed for Cough for up to 7 days.

## 2018-08-30 NOTE — TELEPHONE ENCOUNTER
Requested Prescriptions     Pending Prescriptions Disp Refills    furosemide (LASIX) 40 mg tablet 30 Tab 5     Sig: Take 1 Tab by mouth daily.  albuterol (VENTOLIN HFA) 90 mcg/actuation inhaler 1 Inhaler 5     Sig: Take 2 Puffs by inhalation every six (6) hours as needed for Wheezing.

## 2018-08-31 ENCOUNTER — ANESTHESIA EVENT (OUTPATIENT)
Dept: SURGERY | Age: 58
End: 2018-08-31
Payer: MEDICAID

## 2018-09-04 ENCOUNTER — HOSPITAL ENCOUNTER (OUTPATIENT)
Age: 58
Setting detail: OUTPATIENT SURGERY
Discharge: HOME | End: 2018-09-04
Attending: SURGERY | Admitting: SURGERY
Payer: MEDICAID

## 2018-09-04 ENCOUNTER — ANESTHESIA (OUTPATIENT)
Dept: SURGERY | Age: 58
End: 2018-09-04
Payer: MEDICAID

## 2018-09-04 VITALS
HEART RATE: 81 BPM | TEMPERATURE: 98.7 F | HEIGHT: 68 IN | RESPIRATION RATE: 16 BRPM | WEIGHT: 243 LBS | SYSTOLIC BLOOD PRESSURE: 145 MMHG | DIASTOLIC BLOOD PRESSURE: 94 MMHG | BODY MASS INDEX: 36.83 KG/M2 | OXYGEN SATURATION: 96 %

## 2018-09-04 LAB
ATRIAL RATE: 81 BPM
BUN BLD-MCNC: 9 MG/DL (ref 7–18)
CALCULATED P AXIS, ECG09: 74 DEGREES
CALCULATED R AXIS, ECG10: -9 DEGREES
CALCULATED T AXIS, ECG11: 50 DEGREES
CHLORIDE BLD-SCNC: 100 MMOL/L (ref 100–108)
DIAGNOSIS, 93000: NORMAL
GLUCOSE BLD STRIP.AUTO-MCNC: 102 MG/DL (ref 74–106)
HCT VFR BLD CALC: 36 % (ref 36–49)
HGB BLD-MCNC: 12.2 G/DL (ref 12–16)
P-R INTERVAL, ECG05: 160 MS
POTASSIUM BLD-SCNC: 4.1 MMOL/L (ref 3.5–5.5)
Q-T INTERVAL, ECG07: 404 MS
QRS DURATION, ECG06: 88 MS
QTC CALCULATION (BEZET), ECG08: 469 MS
SODIUM BLD-SCNC: 140 MMOL/L (ref 136–145)
VENTRICULAR RATE, ECG03: 81 BPM

## 2018-09-04 PROCEDURE — 74011250636 HC RX REV CODE- 250/636: Performed by: ANESTHESIOLOGY

## 2018-09-04 PROCEDURE — 93005 ELECTROCARDIOGRAM TRACING: CPT

## 2018-09-04 PROCEDURE — 96374 THER/PROPH/DIAG INJ IV PUSH: CPT

## 2018-09-04 PROCEDURE — 74011250636 HC RX REV CODE- 250/636

## 2018-09-04 PROCEDURE — 74011000250 HC RX REV CODE- 250: Performed by: ANESTHESIOLOGY

## 2018-09-04 PROCEDURE — 74011250637 HC RX REV CODE- 250/637: Performed by: NURSE ANESTHETIST, CERTIFIED REGISTERED

## 2018-09-04 PROCEDURE — 84295 ASSAY OF SERUM SODIUM: CPT

## 2018-09-04 PROCEDURE — 74011250636 HC RX REV CODE- 250/636: Performed by: NURSE ANESTHETIST, CERTIFIED REGISTERED

## 2018-09-04 RX ORDER — CEFAZOLIN SODIUM 2 G/50ML
SOLUTION INTRAVENOUS
Status: DISCONTINUED
Start: 2018-09-04 | End: 2018-09-04 | Stop reason: HOSPADM

## 2018-09-04 RX ORDER — SODIUM CHLORIDE, SODIUM LACTATE, POTASSIUM CHLORIDE, CALCIUM CHLORIDE 600; 310; 30; 20 MG/100ML; MG/100ML; MG/100ML; MG/100ML
75 INJECTION, SOLUTION INTRAVENOUS CONTINUOUS
Status: DISCONTINUED | OUTPATIENT
Start: 2018-09-05 | End: 2018-09-04 | Stop reason: HOSPADM

## 2018-09-04 RX ORDER — FAMOTIDINE 20 MG/1
20 TABLET, FILM COATED ORAL ONCE
Status: COMPLETED | OUTPATIENT
Start: 2018-09-04 | End: 2018-09-04

## 2018-09-04 RX ORDER — IPRATROPIUM BROMIDE AND ALBUTEROL SULFATE 2.5; .5 MG/3ML; MG/3ML
3 SOLUTION RESPIRATORY (INHALATION)
Status: COMPLETED | OUTPATIENT
Start: 2018-09-04 | End: 2018-09-04

## 2018-09-04 RX ORDER — CEFAZOLIN SODIUM 2 G/50ML
2 SOLUTION INTRAVENOUS
Status: DISCONTINUED | OUTPATIENT
Start: 2018-09-04 | End: 2018-09-04 | Stop reason: HOSPADM

## 2018-09-04 RX ADMIN — FAMOTIDINE 20 MG: 20 TABLET ORAL at 09:59

## 2018-09-04 RX ADMIN — IPRATROPIUM BROMIDE AND ALBUTEROL SULFATE 3 ML: .5; 3 SOLUTION RESPIRATORY (INHALATION) at 10:22

## 2018-09-04 RX ADMIN — METHYLPREDNISOLONE SODIUM SUCCINATE 125 MG: 125 INJECTION, POWDER, FOR SOLUTION INTRAMUSCULAR; INTRAVENOUS at 10:25

## 2018-09-04 NOTE — PROGRESS NOTES
Date of Surgery Update: 
Meghann Washington was seen and examined. History and physical has been reviewed. The patient has been examined. There have been no significant clinical changes since the completion of the originally dated History and Physical. Will proceed with excision of right neck masses (x2).   
 
Signed By: Jean Phoenix, MD   
 September 4, 2018 9:47 AM

## 2018-09-04 NOTE — PROGRESS NOTES
Patient is not cleared for surgery per anesthesia team. Will cancel surgery and asked patient to call PCP office for asthma treatment.

## 2018-09-04 NOTE — ANESTHESIA PREPROCEDURE EVALUATION
Anesthetic History No history of anesthetic complications Review of Systems / Medical History Patient summary reviewed and pertinent labs reviewed Pulmonary Smoker Asthma : poorly controlled Neuro/Psych Within defined limits Cardiovascular Hypertension Exercise tolerance: <4 METS 
  
GI/Hepatic/Renal 
Within defined limits Endo/Other Morbid obesity Other Findings Comments: Documentation of current medication Current medications obtained, documented and obtained? YES Risk Factors for Postoperative nausea/vomiting: 
     History of postoperative nausea/vomiting? NO Female? YES Motion sickness? NO Intended opioid administration for postoperative analgesia? YES Smoking Abstinence: 
Current Smoker? YES Elective Surgery? YES Seen preoperatively by anesthesiologist or proxy prior to day of surgery? YES Pt abstained from smoking 24 hours prior to anesthesia? NO Preventive care/screening for High Blood Pressure: 
Aged 18 years and older: YES Screened for high blood pressure: YES Patients with high blood pressure referred to primary care provider 
 for BP management: YES Physical Exam 
 
Airway Mallampati: III 
TM Distance: 4 - 6 cm Neck ROM: normal range of motion Mouth opening: Normal 
 
 Cardiovascular Regular rate and rhythm,  S1 and S2 normal,  no murmur, click, rub, or gallop Rhythm: regular Rate: normal 
 
 
 
 Dental 
 
Dentition: Poor dentition and Caps/crowns Comments: The patient has very poor dentition. Loose, broken, and or missing teeth. I explained the risk of dental damage and or loss. The patient understands and accepts these risks. Pulmonary Wheezes:bilateral 
 
 
 
 Abdominal 
GI exam deferred Other Findings Anesthetic Plan ASA: 4 Anesthesia type: general 
 
 
 
 
Induction: Intravenous Anesthetic plan and risks discussed with: Patient Will attempt to improve bronchospasm with duoneb and steroids

## 2018-09-17 ENCOUNTER — OFFICE VISIT (OUTPATIENT)
Dept: INTERNAL MEDICINE CLINIC | Age: 58
End: 2018-09-17

## 2018-09-17 VITALS
BODY MASS INDEX: 36.1 KG/M2 | RESPIRATION RATE: 22 BRPM | DIASTOLIC BLOOD PRESSURE: 83 MMHG | HEIGHT: 68 IN | OXYGEN SATURATION: 93 % | SYSTOLIC BLOOD PRESSURE: 139 MMHG | TEMPERATURE: 97.9 F | WEIGHT: 238.2 LBS | HEART RATE: 56 BPM

## 2018-09-17 DIAGNOSIS — Z79.899 MEDICATION MANAGEMENT: ICD-10-CM

## 2018-09-17 DIAGNOSIS — J45.20 MILD INTERMITTENT ASTHMA WITHOUT COMPLICATION: Primary | Chronic | ICD-10-CM

## 2018-09-17 RX ORDER — MONTELUKAST SODIUM 10 MG/1
10 TABLET ORAL DAILY
Qty: 30 TAB | Refills: 1 | Status: SHIPPED | OUTPATIENT
Start: 2018-09-17

## 2018-09-17 NOTE — PROGRESS NOTES
HISTORY OF PRESENT ILLNESS  Jayjay Martinez is a 59 y.o. female. Visit Vitals    /83 (BP 1 Location: Right arm, BP Patient Position: Sitting)    Pulse (!) 56    Temp 97.9 °F (36.6 °C) (Oral)    Resp 22    Ht 5' 8\" (1.727 m)    Wt 238 lb 3.2 oz (108 kg)    SpO2 93%    BMI 36.22 kg/m2       HPI Comments: Pt says she was told at her surgery she \"did not have enough wind to have surgery. \" That she could not \"blow at least a 5\" --I assume this was a spirometer of some type. From the notes it appears that she was wheezing at the time of the surgery. The anesthesiologist cancelled the surgery as a result. She says they gave her a treatment and she did not get any better. Told her she needed to have steroids. The note is NOT VERY CLEAR. Pt is also a terrible historian! She has cut down to 5 cigarettes a day  She also says the insurance did not cover some medication--but she can not tell us what. Breathing Problem   The history is provided by the patient (see comments). This is a new problem. Pertinent negatives include no fever, no cough and no chest pain. Review of Systems   Constitutional: Negative for chills and fever. Respiratory: Positive for shortness of breath. Negative for cough. Cardiovascular: Negative for chest pain and palpitations. Physical Exam   Constitutional: She is oriented to person, place, and time. She appears well-developed and well-nourished. No distress. Cardiovascular: Normal rate. Pulmonary/Chest: Effort normal and breath sounds normal. No respiratory distress. She has no wheezes. She has no rales. Neurological: She is alert and oriented to person, place, and time. Skin: Skin is warm and dry. She is not diaphoretic. Psychiatric: She has a normal mood and affect. Nursing note and vitals reviewed.       ASSESSMENT and PLAN    ICD-10-CM ICD-9-CM    1. Mild intermittent asthma without complication X59.75 728.68 montelukast (SINGULAIR) 10 mg tablet      tiotropium (SPIRIVA) 18 mcg inhalation capsule      REFERRAL TO PULMONARY DISEASE      XR CHEST PA LAT   2. Medication management Z79.899 V58.69      Asthma/ wheezing. She sounds good today.  But she states she still has sxs kaleb at night    She has cut back on her cigarettes a lot    Will add singulair and spiriva  Refer to pulmonary for evaluation and tuning up of her breathing  CXR on order    F/u one month

## 2018-09-17 NOTE — MR AVS SNAPSHOT
[address] [facility] 
 
 
 [address] [facility] [address] [address] 
[phone] Patient: Mary Frazier 
MRN: [id number] Visit Information Date & Time Provider Department Dept. Phone Encounter #  
 9/17/2018 11:30 AM Mikie Holbrook MD [facility] [phone] [id number] Follow-up Instructions Return in about 4 weeks (around 10/15/2018) for med changes. asthma. Upcoming Health Maintenance Date Due COLONOSCOPY 6/25/1976 Pneumococcal 19-64 Medium Risk (1 of 1 - PPSV23) 6/25/1977 DTaP/Tdap/Td series (1 - Tdap) 6/25/1979 BREAST CANCER SCRN MAMMOGRAM 6/25/2008 ZOSTER VACCINE AGE 60> 4/25/2018 Influenza Age 5 to Adult 8/1/2018 PAP AKA CERVICAL CYTOLOGY 10/23/2018 Allergies as of 9/17/2018  Review Complete On: 9/17/2018 By: Mikie Holbrook MD  
 No Known Allergies Current Immunizations  Reviewed on 3/6/2018 Name Date Influenza Vaccine 10/19/2010 Not reviewed this visit You Were Diagnosed With   
  
 Codes Comments Mild intermittent asthma without complication    -  Primary ICD-10-CM: J45.20 ICD-9-CM: 493.90 Medication management     ICD-10-CM: Z79.899 ICD-9-CM: V58.69 Vitals BP Pulse Temp Resp Height(growth percentile) Weight(growth percentile) 139/83 (BP 1 Location: Right arm, BP Patient Position: Sitting) (!) 56 97.9 °F (36.6 °C) (Oral) 22 5' 8\" (1.727 m) 238 lb 3.2 oz (108 kg) SpO2 BMI OB Status Smoking Status 93% 36.22 kg/m2 Postmenopausal Current Every Day Smoker Vitals History BMI and BSA Data Body Mass Index Body Surface Area  
 36.22 kg/m 2 2.28 m 2 Preferred Pharmacy Pharmacy Name Phone [facility] [address], [place] [facility] [phone] Your Updated Medication List  
  
   
This list is accurate as of 9/17/18 12:36 PM.  Always use your most recent med list.  
  
  
  
  
 albuterol 90 mcg/actuation inhaler Commonly known as:  VENTOLIN HFA Take 2 Puffs by inhalation every six (6) hours as needed for Wheezing. amitriptyline 25 mg tablet Commonly known as:  ELAVIL Take 1 Tab by mouth nightly. aspirin 81 mg chewable tablet Take 81 mg by mouth daily. atorvastatin 20 mg tablet Commonly known as:  LIPITOR Take 1 Tab by mouth daily. beclomethasone 80 mcg/actuation [employer] Commonly known as:  QVAR Take 1 Puff by inhalation two (2) times a day. butalbital-acetaminophen-caffeine -40 mg per tablet Commonly known as:  Rawleigh Salen Take 1 Tab by mouth every six (6) hours as needed for Pain. furosemide 40 mg tablet Commonly known as:  LASIX Take 1 Tab by mouth daily. meloxicam 15 mg tablet Commonly known as:  MOBIC Take 1 Tab by mouth daily. montelukast 10 mg tablet Commonly known as:  SINGULAIR Take 1 Tab by mouth daily. potassium chloride 20 mEq tablet Commonly known as:  K-DUR, KLOR-CON Take 1 Tab by mouth daily. tiotropium 18 mcg inhalation capsule Commonly known as:  Jaquan Dash Take 1 Cap by inhalation daily. Prescriptions Sent to Pharmacy Refills  
 montelukast (SINGULAIR) 10 mg tablet 1 Sig: Take 1 Tab by mouth daily. Class: Normal  
 Pharmacy: [facility] [address] JENNIFER COOK [address], [address] Ph #: [phone] Route: Oral  
 tiotropium (SPIRIVA) 18 mcg inhalation capsule 5 Sig: Take 1 Cap by inhalation daily. Class: Normal  
 Pharmacy: [facility] [address] JENNIFER COOK [address], [address] Ph #: [phone] Route: Inhalation We Performed the Following REFERRAL TO PULMONARY DISEASE [JDT10 Custom] Follow-up Instructions Return in about 4 weeks (around 10/15/2018) for med changes. asthma. To-Do List   
 09/17/2018 Imaging:  XR CHEST PA LAT Referral Information Referral ID Referred By Referred To  
  
 [id number] Aris Mattson MD   
   [address] [facility] [facility] [address] [facility] Phone: [phone] Fax: [phone] Visits Status Start Date End Date 1 New Request 9/17/18 9/17/19 If your referral has a status of pending review or denied, additional information will be sent to support the outcome of this decision. Introducing [facility]! [employer] introduces [employer] patient portal. Now you can access parts of your medical record, email your doctor's office, and request medication refills online. 1. In your internet browser, go to [email or web address] 2. Click on the First Time User? Click Here link in the Sign In box. You will see the New Member Sign Up page. 3. Enter your [employer] Access Code exactly as it appears below. You will not need to use this code after youve completed the sign-up process. If you do not sign up before the expiration date, you must request a new code. · [employer] Access Code: [email or web address] Expires: 12/16/2018 12:36 PM 
 
4. Enter the last four digits of your Social Security Number (xxxx) and Date of Birth (mm/dd/yyyy) as indicated and click Submit. You will be taken to the next sign-up page. 5. Create a [employer]Club ID. This will be your [employer] login ID and cannot be changed, so think of one that is secure and easy to remember. 6. Create a [employer] password. You can change your password at any time. 7. Enter your Password Reset Question and Answer. This can be used at a later time if you forget your password. 8. Enter your e-mail address. You will receive e-mail notification when new information is available in [address] 9. Click Sign Up. You can now view and download portions of your medical record. 10. Click the Download Summary menu link to download a portable copy of your medical information. If you have questions, please visit the Frequently Asked Questions section of the [employer] website. Remember, [employer] is NOT to be used for urgent needs. For medical emergencies, dial 911. Now available from your iPhone and Android! Please provide this summary of care documentation to your next provider. Your primary care clinician is listed as [facility]. If you have any questions after today's visit, please call [phone].

## 2018-09-17 NOTE — PROGRESS NOTES
ROOM # 6    Massiel Smallwood presents today for   Chief Complaint   Patient presents with    Breathing Problem     surgery cancelled due to this issue per pt       Massiel Smallwood preferred language for health care discussion is english/other. Is someone accompanying this pt? no    Is the patient using any DME equipment during OV? no    Depression Screening:  PHQ over the last two weeks 7/9/2018 6/5/2018 4/25/2017 5/19/2016 1/7/2015 1/7/2015   Little interest or pleasure in doing things Not at all Several days Not at all Not at all Several days Not at all   Feeling down, depressed, irritable, or hopeless Not at all Several days Not at all Not at all More than half the days Not at all   Total Score PHQ 2 0 2 0 0 3 0       Learning Assessment:  Learning Assessment 6/5/2018   PRIMARY LEARNER Patient   HIGHEST LEVEL OF EDUCATION - PRIMARY LEARNER  GRADUATED HIGH SCHOOL OR GED   BARRIERS PRIMARY LEARNER NONE   CO-LEARNER CAREGIVER No   PRIMARY LANGUAGE ENGLISH   LEARNER PREFERENCE PRIMARY LISTENING   ANSWERED BY Patient   RELATIONSHIP SELF       Health Maintenance reviewed and discussed per provider. Yes    Massiel Smallwood is due for   Health Maintenance Due   Topic Date Due    COLONOSCOPY  06/25/1976    Pneumococcal 19-64 Medium Risk (1 of 1 - PPSV23) 06/25/1977    DTaP/Tdap/Td series (1 - Tdap) 06/25/1979    BREAST CANCER SCRN MAMMOGRAM  06/25/2008    ZOSTER VACCINE AGE 60>  04/25/2018    Influenza Age 9 to Adult  08/01/2018    PAP AKA CERVICAL CYTOLOGY  10/23/2018     Please order/place referral if appropriate. Advance Directive:  1. Do you have an advance directive in place? Patient Reply: no    2. If not, would you like material regarding how to put one in place? Patient Reply: no    Coordination of Care:  1. Have you been to the ER, urgent care clinic since your last visit? Hospitalized since your last visit? no    2.  Have you seen or consulted any other health care providers outside of the [employer] [address] since your last visit? Include any pap smears or colon screening.  no

## (undated) DEVICE — DEPAUL MAJOR PROCEDURE PACK: Brand: MEDLINE INDUSTRIES, INC.

## (undated) DEVICE — SYR 10ML CTRL LR LCK NSAF LF --

## (undated) DEVICE — REM POLYHESIVE ADULT PATIENT RETURN ELECTRODE: Brand: VALLEYLAB

## (undated) DEVICE — PREP SKN CHLRAPRP 26ML TNT -- CONVERT TO ITEM 373320

## (undated) DEVICE — KENDALL SCD EXPRESS SLEEVES, KNEE LENGTH, MEDIUM: Brand: KENDALL SCD

## (undated) DEVICE — STERILE POLYISOPRENE POWDER-FREE SURGICAL GLOVES: Brand: PROTEXIS

## (undated) DEVICE — (D)STRIP SKN CLSR 0.5X4IN WHT --

## (undated) DEVICE — INSULATED BLADE ELECTRODE: Brand: EDGE

## (undated) DEVICE — SOL IRR NACL 0.9% 500ML POUR --

## (undated) DEVICE — NEEDLE HYPO 25GA L1.5IN BLU POLYPR HUB S STL REG BVL STR